# Patient Record
Sex: FEMALE | Race: WHITE | NOT HISPANIC OR LATINO | URBAN - METROPOLITAN AREA
[De-identification: names, ages, dates, MRNs, and addresses within clinical notes are randomized per-mention and may not be internally consistent; named-entity substitution may affect disease eponyms.]

---

## 2018-07-25 ENCOUNTER — EMERGENCY (EMERGENCY)
Facility: HOSPITAL | Age: 43
LOS: 1 days | Discharge: HOME | End: 2018-07-25
Attending: EMERGENCY MEDICINE
Payer: COMMERCIAL

## 2018-07-25 VITALS
RESPIRATION RATE: 16 BRPM | SYSTOLIC BLOOD PRESSURE: 102 MMHG | HEART RATE: 82 BPM | OXYGEN SATURATION: 100 % | WEIGHT: 111.99 LBS | DIASTOLIC BLOOD PRESSURE: 60 MMHG | TEMPERATURE: 98 F

## 2018-07-25 VITALS
RESPIRATION RATE: 18 BRPM | DIASTOLIC BLOOD PRESSURE: 64 MMHG | HEART RATE: 73 BPM | TEMPERATURE: 98 F | OXYGEN SATURATION: 99 % | SYSTOLIC BLOOD PRESSURE: 98 MMHG

## 2018-07-25 PROCEDURE — 99283 EMERGENCY DEPT VISIT LOW MDM: CPT

## 2018-07-25 PROCEDURE — 99283 EMERGENCY DEPT VISIT LOW MDM: CPT | Mod: 25

## 2018-07-25 RX ORDER — IBUPROFEN 200 MG
600 TABLET ORAL ONCE
Qty: 0 | Refills: 0 | Status: COMPLETED | OUTPATIENT
Start: 2018-07-25 | End: 2018-07-25

## 2018-07-25 RX ADMIN — Medication 600 MILLIGRAM(S): at 05:23

## 2018-07-25 RX ADMIN — Medication 600 MILLIGRAM(S): at 06:23

## 2018-07-25 NOTE — ED PROCEDURE NOTE - CPROC ED POST PROC CARE GUIDE1
Elevate the injured extremity as instructed./Verbal/written post procedure instructions were given to patient/caregiver./Instructed patient/caregiver to follow-up with primary care physician./Instructed patient/caregiver regarding signs and symptoms of infection./Keep the cast/splint/dressing clean and dry.

## 2018-07-25 NOTE — ED PROVIDER NOTE - PHYSICAL EXAMINATION
Bailey Herbert M.D: pt awake, alert, seen lying on stretcher NAD.  ;eft ankle without any bony tenderness over medial or lateral malleolus no pain at base of 5th metatarsal. pain inferior to lateral mal with minimal bruising and swelling. 2+ DP pulse. full ROM in ankle, toes, knee. sensation intact in LLE.

## 2018-07-25 NOTE — ED PROVIDER NOTE - MEDICAL DECISION MAKING DETAILS
[place] ankle rules negative- no indication for ankle xr at this time. clinically pt with ankle sprain. will ace wrap give crutches, motrin, and dc.

## 2018-07-25 NOTE — ED PROVIDER NOTE - OBJECTIVE STATEMENT
VONDA OliverD: 43F no PMH p/w left ankle pain after fall yesterday. was running and tripped and fell and rolled her ankle. was able to ambulate yesterday afterwards but today notes severe pain to latereal aspect of ankle. no numbness/tingling to foot able to move foot and ankle.

## 2018-07-25 NOTE — ED ADULT NURSE NOTE - OBJECTIVE STATEMENT
42 y/o F patient presents to ED from home c/o ankle pain/injury. Patient states she was running last night and "twisted her ankle." Patient states pain worsened a few hours later. Patient A&Ox3. skin warm and intact. mild redness noted to left ankle. no swelling noted. pedal pulses present b/l. decreased movement and strength noted in left ankle. positive sensation noted in lower extremities b/l. Patient denies HA, dizziness, SOB, chest pain, abdominal pain, N/V/D, bowel/bladder changes. VSS. Safety and comfort measures provided and maintained. MD bedside.

## 2018-07-25 NOTE — ED PROVIDER NOTE - ATTENDING CONTRIBUTION TO CARE
43 yof no pmhx left ankle lateral mod pain after tripping while out for a run a few nights ago in the dark. did not hit head, no loc. states was walking on it but now is having increased pain w walking    ROS:   constitutional - no fever, no chills  eyes - no visual changes, no redness  eent - no sore throat, no nasal congestion  cvs - no chest pain, no leg swelling  resp - no shortness of breath, no cough  gi - no abdominal pain, no vomiting, no diarrhea  gu - no dysuria, no hematuria  msk - no acute back pain, no joint swelling  skin - no rashes, no jaundice  neuro - no headache, no focal weakness  psych - no acute mental health issue     Physical Exam:   constitutional - well appearing, awake and alert, oriented x3  head - no external evidence of trauma  cvs - rrr, no murmurs, no peripheral edema  resp - breath sounds clear and equal bilat  gi - abdomen soft and nontender, no rigidity, guarding or rebound, bowel sounds present  msk - moving all extremities spontaneously. left ankle mild lateral malleolus tenderness. no ecchymosis. mild swelling, dp pulses are intact. able to weight bear but w pain  neuro - alert and oriented x3, no focal deficits, CNs 2-12 grossly intact  skin- no jaundice, warm and dry  psych - mood and affect wnl, no apparent risk to self or others     likely ankle sprain. pt declined xray. f/u given, air cast/ crutches w training given. additional verbal instructions regarding diagnosis, return precautions and follow up plan given to pt and/or family. NIKUNJ Dailey MD

## 2019-08-15 ENCOUNTER — APPOINTMENT (OUTPATIENT)
Dept: OBGYN | Facility: CLINIC | Age: 44
End: 2019-08-15
Payer: COMMERCIAL

## 2019-08-15 ENCOUNTER — ASOB RESULT (OUTPATIENT)
Age: 44
End: 2019-08-15

## 2019-08-15 PROCEDURE — 76830 TRANSVAGINAL US NON-OB: CPT

## 2019-08-30 ENCOUNTER — RESULT REVIEW (OUTPATIENT)
Age: 44
End: 2019-08-30

## 2020-11-19 ENCOUNTER — NON-APPOINTMENT (OUTPATIENT)
Age: 45
End: 2020-11-19

## 2021-08-05 ENCOUNTER — RESULT REVIEW (OUTPATIENT)
Age: 46
End: 2021-08-05

## 2025-07-11 NOTE — ED ADULT NURSE NOTE - NS ED NURSE LEVEL OF CONSCIOUSNESS MENTAL STATUS
"  Assessment and Plan    (E11.9) Type 2 diabetes mellitus without complication, without long-term current use of insulin (H)  (primary encounter diagnosis)  Comment: A1c inproved and at goal, has med refills  Plan: HEMOGLOBIN A1C            (I10) Benign essential hypertension  Comment: BP looks quite good, refilling  Plan: lisinopril (ZESTRIL) 10 MG tablet              RTC in     Bharath Love MD   The longitudinal plan of care for the diagnosis(es)/condition(s) as documented were addressed during this visit. Due to the added complexity in care, I will continue to support patient in the subsequent management and with ongoing continuity of care.      Guru Lima is a 61 year old, presenting for the following health issues:  Follow Up (DM) and Recheck Medication        7/11/2025     3:11 PM   Additional Questions   Roomed by Milana VELASCO         7/11/2025     3:11 PM   Patient Reported Additional Medications   Patient reports taking the following new medications n/a     History of Present Illness       Diabetes:   He presents for follow up of diabetes.  He is checking home blood glucose a few times a month.   He checks blood glucose before meals.  Blood glucose is sometimes over 200 and never under 70. He is aware of hypoglycemia symptoms including shakiness.    He has no concerns regarding his diabetes at this time.   He is not experiencing numbness or burning in feet, excessive thirst, blurry vision, weight changes or redness, sores or blisters on feet. The patient has not had a diabetic eye exam in the last 12 months.               Jardiance is making him \"pee like crazy\" - even getting up overnight to urinate.  Otherwise tolerating things well.  Overall feeling really well.  Denies CP, palpitations, edema, dyspnea, HA, vision changes.         Objective    /81   Pulse 78   Temp 98.1  F (36.7  C) (Oral)   Resp 18   Ht 1.753 m (5' 9\")   Wt 91.1 kg (200 lb 14.4 oz)   SpO2 95%   BMI 29.67 kg/m    Body " mass index is 29.67 kg/m .  Physical Exam  Vitals and nursing note reviewed.   HENT:      Head: Normocephalic.   Eyes:      Conjunctiva/sclera: Conjunctivae normal.   Cardiovascular:      Rate and Rhythm: Normal rate and regular rhythm.      Heart sounds: Normal heart sounds.   Pulmonary:      Effort: Pulmonary effort is normal.      Breath sounds: Normal breath sounds.   Skin:     General: Skin is warm and dry.   Neurological:      General: No focal deficit present.      Mental Status: He is alert and oriented to person, place, and time.   Psychiatric:         Mood and Affect: Mood normal.         Behavior: Behavior normal.            Signed Electronically by: Bharath Love MD     Alert/Awake